# Patient Record
Sex: MALE | Race: WHITE
[De-identification: names, ages, dates, MRNs, and addresses within clinical notes are randomized per-mention and may not be internally consistent; named-entity substitution may affect disease eponyms.]

---

## 2019-10-12 ENCOUNTER — HOSPITAL ENCOUNTER (EMERGENCY)
Dept: HOSPITAL 76 - ED | Age: 34
Discharge: HOME | End: 2019-10-12
Payer: COMMERCIAL

## 2019-10-12 VITALS — SYSTOLIC BLOOD PRESSURE: 128 MMHG | DIASTOLIC BLOOD PRESSURE: 73 MMHG

## 2019-10-12 DIAGNOSIS — Z76.0: Primary | ICD-10-CM

## 2019-10-12 PROCEDURE — 99281 EMR DPT VST MAYX REQ PHY/QHP: CPT

## 2019-10-12 NOTE — ED PHYSICIAN DOCUMENTATION
History of Present Illness





- Stated complaint


Stated Complaint: MED REFILL





- Chief complaint


Chief Complaint: General





- History obtained from


History obtained from: Patient





- History of Present Illness


Timing: Today


Pain level max: 0


Pain level now: 0





- Additonal information


Additional information: 





Patient is out of his genvoya. Requesting refill. No other complaints. 





Review of Systems


Constitutional: denies: Fever


Skin: denies: Rash





PD PAST MEDICAL HISTORY





- Past Medical History


Past Medical History: No





- Past Surgical History


Past Surgical History: No





- Present Medications


Home Medications: 


                                Ambulatory Orders











 Medication  Instructions  Recorded  Confirmed


 


Elviteg/Cob/Emtri/Tenof Alafen 1 tab PO DAILY #30 tablet 10/12/19 





[Genvoya Tablet]   














- Allergies


Allergies/Adverse Reactions: 


                                    Allergies











Allergy/AdvReac Type Severity Reaction Status Date / Time


 


No Known Drug Allergies Allergy   Verified 10/12/19 11:50














- Living Situation


Living Situation: reports: With family


Living Arrangement: reports: At home





- Social History


Does the pt have substance abuse?: No





- Family History


Family history: reports: Non contributory





PD ED PE NORMAL





- Vitals


Vital signs reviewed: Yes





- General


General: Alert and oriented X 3, No acute distress





- HEENT


HEENT: Moist mucous membranes





- Neck


Neck: Supple, no meningeal sign





- Respiratory


Respiratory: No respiratory distress





- Derm


Derm: Warm and dry





- Neuro


Neuro: Alert and oriented X 3





Results





- Vitals


Vitals: 


                               Vital Signs - 24 hr











  10/12/19





  11:45


 


Temperature 36.5 C


 


Heart Rate 84


 


Respiratory 15





Rate 


 


Blood Pressure 128/73


 


O2 Saturation 97








                                     Oxygen











O2 Source                      Room air

















PD MEDICAL DECISION MAKING





- ED course


Complexity details: considered differential, d/w patient


ED course: 





medication refilled. no other complaints.





Departure





- Departure


Disposition: 01 Home, Self Care


Clinical Impression: 


 Medication refill





Condition: Good


Follow-Up: 


ROMMEL MAX MD [Primary Care Provider] - Within 1 week


Prescriptions: 


Elviteg/Cob/Emtri/Tenof Alafen [Genvoya Tablet] 1 tab PO DAILY #30 tablet


Comments: 


Follow-up with your doctor for further medication refills.  Return if you worsen

.

## 2020-09-04 ENCOUNTER — HOSPITAL ENCOUNTER (OUTPATIENT)
Dept: HOSPITAL 76 - SC | Age: 35
Discharge: HOME | End: 2020-09-04
Attending: NURSE PRACTITIONER
Payer: COMMERCIAL

## 2020-09-04 VITALS — SYSTOLIC BLOOD PRESSURE: 129 MMHG | DIASTOLIC BLOOD PRESSURE: 90 MMHG

## 2020-09-04 DIAGNOSIS — R06.83: Primary | ICD-10-CM

## 2020-09-04 DIAGNOSIS — R53.83: ICD-10-CM

## 2020-09-04 DIAGNOSIS — E66.9: ICD-10-CM

## 2020-09-04 DIAGNOSIS — R06.81: ICD-10-CM

## 2020-09-04 DIAGNOSIS — F17.210: ICD-10-CM

## 2020-09-04 DIAGNOSIS — G47.8: ICD-10-CM

## 2020-09-04 DIAGNOSIS — G47.10: ICD-10-CM

## 2020-09-04 PROCEDURE — 99204 OFFICE O/P NEW MOD 45 MIN: CPT

## 2020-09-04 PROCEDURE — 99212 OFFICE O/P EST SF 10 MIN: CPT

## 2020-09-04 NOTE — SLEEP CARE CONSULTATION
Information from patient questionnaire entered by Pricilla Bhatt.





I have reviewed and concur with the information entered by Pricilla Bhatt. This 

document represents the service I personally performed and the decisions made by

me, Aura Mccoy ARNP.





History of Present Illness


Service Date and Time: 09/04/2020    0808


Reason for Visit: New patient


Chief Complaint: reports: Unrefreshed sleep, Snoring, Excessive daytime 

sleepiness, Observed pauses in breathing, Fatigue, Frequent awakenings at night.

 denies: Insomnia


Date of Onset: 22 years


Usual bedtime: 2100


Time it takes to fall asleep: 5-10 minutes


Snores at night: Yes


Observed to quit breathing while asleep: Yes


Sleeps alone due to snoring: Yes


Number of times waking at night: 3-6


Reasons for waking at night: reports: Choking (infrequent), Snoring, Gasping for

air (nightly), Other (awaking with the feeling of "impending doom" nightly)


Toss, Turn, or Twitch while sleeping: Yes ("flailing arms" every night)


Recalls having dreams: No


Usually gets out of bed at: 0700


Feels refreshed in the morning: No


Morning headache: Yes (every other day; last an hour without meds)


Sleepy or fatigued during the day: Yes


Ever fallen asleep while driving: No (drowsy driving; falls asleep as passenger 

in car)


Takes day naps: Yes (twice a week during workweek and on weekends)


Dreams during day naps: No


Prior sleep studies: Yes


Year and Where: 2015 [place], [place]


Additional HPI information: 





I had the pleasure of seeing NIXON PICKETT today regarding the possibility of him 

having a sleep disorder. His current complaints are snoring, observed pauses in 

breathing, unrefreshed sleep and fatigue. His previous [facility] ([facility] 

[facility]) showed that he had primary snoring but he slept much better that same night 

than he had before the night of study. He had a UPPP in 2006 and it worked well 

for 6 months, he stopped snoring and was sleeping well. Then he started snoring 

again. His mother has sleep apnea and a younger sister snores. He does not wake 

refreshed in the morning and every other morning he wakens with a headache that 

resolves in an hour without medication. He states his  cannot sleep in 

the same bed due to his loud snoring and he has told him he "flails" his arms 

around hitting him during the night just about every night. He has woken up 

snoring and gasping for breath nightly. He has occasionally woken up choking. He

sleeps from 9 PM to 7 AM nearly every night, including weekends with 3-6 nightly

awakenings.





- Parasomnia Symptoms


Ever been unable to move upon waking from sleep: No


Walks in sleep: No


Talks in sleep: No


Ever acted out dreams in sleep: No (Flails arms during sleep; does not remember 

dreaming)


Ever felt weak in the knees when startled or emotional: No (easily startled if 

not resting well)


Bothered by creepy, crawly, restless sensations in legs: Yes (right leg mostly, 

only on nights cannot fall asleep in 5-10 minutes; 1 wk)


Problems with memory or concentration: Yes (memory and some concentration)





Subjective


Initial [place] Sleepiness Scale score: 19 (in 2020)





Past Medical History


Past Medical History: reports: Other (PTSD).  denies: Hypertension, 

Claustrophobia, Congestive Heart Failure, Diabetes, Stroke, Coronary Heart 

Disease, Insulin resistance, Arrythmia, Hypothyroidism, Anemia, Anxiety, 

Impotence, Depression, Mood disorder, GERD, Attention deficit





Social History


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 Patient is Single and lives in [place].







Have you smoked in the past 12 months: Yes


Cigarettes per day (20/pack): 2 (1 pack/week)


Alcohol use: Yes


Alcohol amount and frequency: 2-3 drinks every other day


Caffeine use: Yes


Caffeine amount and frequency: 2 cups coffee in morning





Family History


Family history of sleep disordered breathing: Yes


Family Hx Sleep Apnea: Mother: Sleep apnea - Treated, Sibling: Snoring





Allergies and Home Medications


Drug allergies reviewed: Yes (NKDA)


Home medication list reviewed: Yes


Allergy and home medication list: 





Lexapro (generic)


Allegra


MVT for men daily





Review of Systems


Weight gain over past 5 years: 20


Cardiovascular: denies: high blood pressure, palpitations, chest pain, irregular

heart rate or pulse, leg or foot swelling


Respiratory: denies: shortness of breath, chronic cough


Gastrointestinal: reports: heartburn.  denies: difficulty swallowing


Urinary: denies: impotence


Neurological: reports: headaches.  denies: seizure, head trauma, speech 

dysfunction, gait or balance problems


Psychiatric: denies: Attention Deficit Hyperactivity, anxiety, depression, mood 

disorder, claustrophobia


Ear/Nose/Throat: reports: nasal congestion, dry mouth/throat (daily due to mouth

breathing at night), tonsillectomy, wisdom teeth removed (has top left, others 

gone).  denies: sinus problems, nose bleeds, injury to nose


Endocrine: denies: thyroid disease


Musculoskeletal: denies: joint pain, muscle pain or cramping, mobility problems


Immunologic: reports: allergies to food or environment (seasonal)





Physical Exam


Blood Pressure: 129/90


Cuff size: long


Heart Rate: 67


O2 Saturation: 99


Height: 6 ft 1 in


Weight: 230 lb


Body Mass Index: 30.3


BMI Classification: Obese


Neck circumference: 15.25 (inches)


HEENT: No craniofacial malformation


Nostrils: patent to airflow


Turbinates: normal


Septum: midline


Mouth and throat: narrow oropharynx


Soft palate: normal


Hard palate: normal


Uvula: normal


Uvula visualization: 50% Mallampati Class II


Tongue: normal in size


Tonsils: absent bilaterally (UPPP 2006)


Chin and jaw: normal size and position


Neck: normal w/o lymphadenopathy or thyromegaly


Heart: regular rate and rhythm


Lungs: clear bilaterally





Impression and Plan





1. Suspected Obstructive Sleep Apnea-Hypopnea Syndrome, as suggested by a 

history of loud and irregular snoring, observed cessation of breath while 

asleep, gasping or choking in sleep, morning headache, frequent awakening during

the night, unrefreshed sleep, cognitive impairment, and excessive daytime 

sleepiness. He has a history of a previous sleep study that found he has primary

snoring and he has a UPPP surgery in 2006 that did reduce his snoring for 6 

months. He has gained weight since the surgery. I discussed with the patient 

that a narrow oropharynx and obesity are common predisposing factors for 

obstructive sleep apnea-hypopnea syndrome.





I recommend proceeding to polysomnography to confirm the diagnosis and to assess

severity. If the patient has significant sleep disordered breathing, a manual 

CPAP titration study will also be performed to find the optimal treatment 

pressure. I informed the patient of what the sleep studies involve and after 

some discussion, obtained agreement to proceed. The pathophysiology of 

obstructive sleep apnea-hypopnea syndrome was discussed with the patient and 

health risks of cardiovascular and cerebrovascular disease if not treated. AAS 

brochure for obstructive sleep apnea-hypopnea syndrome given and reviewed. Risks

of drowsy driving discussed in detail and patient advised to avoid long distance

driving and to pull over at the first sign of drowsiness. Patient agreed to 

plan. [facility] drowsy driving brochure given.





* Schedule polysomnography +- manual CPAP titration study.


* Avoid long distance driving or driving when feeling sleepy.


* Avoid alcohol, sedative and muscle relaxant around bedtime.


* Attempt to lose weight.


* Review instructions provided by trained office staff on how to prepare for the

  sleep study.


* Return for follow-up after sleep study completed.








Visit Type: In Office


Time Spent with Patient (minutes): 37


Provider Statement: I spent 100% of the Face to Face Visit with the patient with

greater than 50% spent counseling the patient and coordination of care.

## 2020-10-03 ENCOUNTER — HOSPITAL ENCOUNTER (OUTPATIENT)
Dept: HOSPITAL 76 - SC | Age: 35
Discharge: HOME | End: 2020-10-03
Attending: NURSE PRACTITIONER
Payer: COMMERCIAL

## 2020-10-03 DIAGNOSIS — E66.3: ICD-10-CM

## 2020-10-03 DIAGNOSIS — G47.33: Primary | ICD-10-CM

## 2020-10-03 DIAGNOSIS — G47.61: ICD-10-CM

## 2020-10-03 PROCEDURE — 95810 POLYSOM 6/> YRS 4/> PARAM: CPT

## 2020-10-13 ENCOUNTER — HOSPITAL ENCOUNTER (OUTPATIENT)
Dept: HOSPITAL 76 - SC | Age: 35
Discharge: HOME | End: 2020-10-13
Attending: NURSE PRACTITIONER
Payer: COMMERCIAL

## 2020-10-13 DIAGNOSIS — G47.33: Primary | ICD-10-CM

## 2020-10-13 DIAGNOSIS — E66.9: ICD-10-CM

## 2020-10-13 PROCEDURE — 99212 OFFICE O/P EST SF 10 MIN: CPT

## 2020-10-13 PROCEDURE — 99213 OFFICE O/P EST LOW 20 MIN: CPT

## 2020-10-13 NOTE — SLEEP CARE CONSULTATION
Information from patient questionnaire entered by Pricilla Bhatt.





I have reviewed and concur with the information entered by Pricilla Bhatt. This 

document represents the service I personally performed and the decisions made by

me, Aura Mccoy ARNP.





History of Present Illness


Service Date and Time: 10/13/2020    0759


Initial [place] Sleepiness Scale score: 19


Current [place] Sleepiness Scale score: 17


Additional HPI information: 





NIXON PICKETT returns for follow up and results of the recently performed 

polysomnography.





I explained the pathophysiology behind obstructive sleep apnea. We then spent 

quite a bit of time discussing different treatment options.  For mild 

obstructive sleep apnea, surgery and oral appliance are alternatives to nasal 

CPAP therapy but in moderate or severe cases, nasal CPAP is the most effective 

and reliable treatment.  After some discussion, the patient opted to go with the

nasal CPAP therapy.  Nasal autoCPAP set at 4-15 cmH20 will be ordered with 

rationale explained. A manual titration study will be ordered if unable to find 

optimal pressure with office adjustments. 





I explained how CPAP machine works with sample devices [employer] Dream[employer] 

and [id number] and what to expect when using the machine.  Using CPAP 

every night in order to get used to it was emphasized.  Patient advised to put 

CPAP mask on before getting into bed so as not to fall asleep without CPAP.  To 

assist acclimation to CPAP use, it could also be used for a short time during 

day while reading or watching TV. The patient was instructed to call the CPAP 

supplier to discuss any mechanical problem that may occur. If the mask given is 

uncomfortable or is difficult to keep on through the night even with adjustment,

contact the CPAP supplier as many will replace with another mask style if 

notified before  30 days.  If snoring or perceives is not getting enough air or 

too much air from the machine, notify this office.  Patient counseled not drink 

alcohol less than 4 hours before bedtime as it can increase snoring and apnea.  

Patient was cautioned about risks of drowsy driving until sleepiness symptoms 

resolve. 





Sleep Study





- Results


Type of Sleep Study: Polysomnography


Prior sleep studies: Yes


Year and Where: 2015 [place], [place]


Polysomnography/Home Sleep Study results: 





IMPRESSION: The quality of the study is good. The patient had normal sleep 

efficiency. The sleep architecture


was abnormal for sleep fragmentation and lack of slow wave sleep (N3). 

Respiratory monitoring showed severe


obstructive sleep apnea-hypopnea (AHI = 38.3) associated with frequent arousals,

oxyhemoglobin desaturation and


mild hypoxia (rosy oxygen saturation of 84%). The respiratory events occurred 

more frequently during supine


sleep (supine AHI = 64.6; non-supine = 29.59). Snore was loud in intensity. 

There was moderate periodic leg


movement of sleep not contributing to the sleep fragmentation. Cardiac rhythm 

was normal sinus rhythm without


significant arrhythmia. No abnormal behavior (parasomnia) observed during the 

night.





Allergies and Home Medications


Drug allergies reviewed: Yes (NKDA)


Home medication list reviewed: Yes (no changes)





Review of Systems


Review of systems same as previous: Yes (no changes)





Physical Exam


Heart Rate: 73


O2 Saturation: 98


Height: 6 ft 1 in


Weight: 230 lb


Body Mass Index: 30.3


BMI Classification: Obese





Impression and Plan





1. Obstructive Sleep Apnea-Hypopnea Syndrome, severe, with lowest oxygen 

saturation of 84%. Obviously this is the cause of the patients symptoms of 

unrefreshed sleep, and excessive daytime sleepiness. Positive pressure therapy 

could benefit his overall health and reduce risks of cardiovascular or 

cerebrovascular events. As mentioned above, the patient will be started on nasal

autoCPAP therapy with pressure set at 4-15 cmH2O. A manual titration study will 

be completed if unable to find optimal treatment pressure with office 

adjustments. Compliance guidelines also reviewed. A copy of compliance 

guidelines will be given for reference at check out. Because the apnea is more 

severe supine, I instructed to avoid sleeping supine using pillow positioning 

until able to start CPAP use. 





* Nasal auto CPAP therapy, pressure at 4-15 cm H2O.


* Attempt to lose weight.


* Avoid alcohol consumption near bedtime.


* Avoid supine sleep until using CPAP.


* The patient is again cautioned about driving until sleepiness completely 

  resolves.


* Return one month after CPAP obtained. I will assess response to therapy and 

  compliance at that time.





Visit Type: In Office


Time Spent with Patient (minutes): 23


Provider Statement: I spent 100% of the Face to Face Visit with the patient with

greater than 50% spent counseling the patient and coordination of care.